# Patient Record
Sex: MALE | URBAN - METROPOLITAN AREA
[De-identification: names, ages, dates, MRNs, and addresses within clinical notes are randomized per-mention and may not be internally consistent; named-entity substitution may affect disease eponyms.]

---

## 2018-01-16 NOTE — RESULT NOTES
Verified Results  Health Maintenance Flow Sheet [id number] 08:33AM Génesis Lax     Test Name Result Flag Reference   Colonoscopy 5 years     [facility]

## 2020-10-13 ENCOUNTER — LAB REQUISITION (OUTPATIENT)
Dept: LAB | Age: 55
End: 2020-10-13

## 2020-10-13 DIAGNOSIS — E11.9 TYPE 2 DIABETES MELLITUS WITHOUT COMPLICATIONS (CMD): ICD-10-CM

## 2020-10-13 LAB
CREAT UR-MCNC: 122 MG/DL
MICROALBUMIN UR-MCNC: 0.9 MG/DL
MICROALBUMIN/CREAT UR: 7.4 MG/G
VIT B12 SERPL-MCNC: 171 PG/ML (ref 211–911)

## 2020-10-13 PROCEDURE — 82043 UR ALBUMIN QUANTITATIVE: CPT | Performed by: CLINICAL MEDICAL LABORATORY

## 2020-10-13 PROCEDURE — PSEU8306 VITAMIN B12: Performed by: CLINICAL MEDICAL LABORATORY

## 2020-10-13 PROCEDURE — 82607 VITAMIN B-12: CPT | Performed by: CLINICAL MEDICAL LABORATORY

## 2020-10-13 PROCEDURE — 82570 ASSAY OF URINE CREATININE: CPT | Performed by: CLINICAL MEDICAL LABORATORY

## 2020-10-13 PROCEDURE — PSEU8567 MICROALBUMIN URINE RANDOM: Performed by: CLINICAL MEDICAL LABORATORY

## 2021-08-26 ENCOUNTER — LAB REQUISITION (OUTPATIENT)
Dept: LAB | Age: 56
End: 2021-08-26

## 2021-08-26 DIAGNOSIS — E11.9 TYPE 2 DIABETES MELLITUS WITHOUT COMPLICATIONS (CMD): ICD-10-CM

## 2021-08-26 LAB — HBA1C MFR BLD: 8 % (ref 4.5–5.6)

## 2021-08-26 PROCEDURE — 83036 HEMOGLOBIN GLYCOSYLATED A1C: CPT | Performed by: CLINICAL MEDICAL LABORATORY

## 2021-08-26 PROCEDURE — 82043 UR ALBUMIN QUANTITATIVE: CPT | Performed by: CLINICAL MEDICAL LABORATORY

## 2021-08-26 PROCEDURE — 82570 ASSAY OF URINE CREATININE: CPT | Performed by: CLINICAL MEDICAL LABORATORY

## 2021-08-26 PROCEDURE — PSEU8306 VITAMIN B12: Performed by: CLINICAL MEDICAL LABORATORY

## 2021-08-26 PROCEDURE — PSEU8567 MICROALBUMIN URINE RANDOM: Performed by: CLINICAL MEDICAL LABORATORY

## 2021-08-26 PROCEDURE — 82607 VITAMIN B-12: CPT | Performed by: CLINICAL MEDICAL LABORATORY

## 2021-08-26 PROCEDURE — PSEU8266 GLYCOHEMOGLOBIN: Performed by: CLINICAL MEDICAL LABORATORY

## 2021-08-27 LAB
CREAT UR-MCNC: 81.8 MG/DL
MICROALBUMIN UR-MCNC: 0.7 MG/DL
MICROALBUMIN/CREAT UR: 8.6 MG/G
VIT B12 SERPL-MCNC: 114 PG/ML (ref 211–911)

## 2021-11-24 ENCOUNTER — LAB REQUISITION (OUTPATIENT)
Dept: LAB | Age: 56
End: 2021-11-24

## 2021-11-24 DIAGNOSIS — E11.9 TYPE 2 DIABETES MELLITUS WITHOUT COMPLICATIONS (CMD): ICD-10-CM

## 2021-11-24 LAB — HBA1C MFR BLD: 6.9 % (ref 4.5–5.6)

## 2021-11-24 PROCEDURE — PSEU8266 GLYCOHEMOGLOBIN: Performed by: CLINICAL MEDICAL LABORATORY

## 2021-11-24 PROCEDURE — 83036 HEMOGLOBIN GLYCOSYLATED A1C: CPT | Performed by: CLINICAL MEDICAL LABORATORY

## 2022-05-03 ENCOUNTER — LAB REQUISITION (OUTPATIENT)
Dept: LAB | Age: 57
End: 2022-05-03

## 2022-05-03 DIAGNOSIS — E11.9 TYPE 2 DIABETES MELLITUS WITHOUT COMPLICATIONS (CMD): ICD-10-CM

## 2022-05-03 LAB — HBA1C MFR BLD: 6.5 % (ref 4.5–5.6)

## 2022-05-03 PROCEDURE — PSEU8266 GLYCOHEMOGLOBIN: Performed by: CLINICAL MEDICAL LABORATORY

## 2022-05-03 PROCEDURE — 83036 HEMOGLOBIN GLYCOSYLATED A1C: CPT | Performed by: CLINICAL MEDICAL LABORATORY

## 2022-07-26 ENCOUNTER — LAB REQUISITION (OUTPATIENT)
Dept: LAB | Age: 57
End: 2022-07-26

## 2022-07-26 DIAGNOSIS — E11.9 TYPE 2 DIABETES MELLITUS WITHOUT COMPLICATIONS (CMD): ICD-10-CM

## 2022-07-26 LAB
HBA1C MFR BLD: 6.4 % (ref 4.5–5.6)
VIT B12 SERPL-MCNC: 153 PG/ML (ref 211–911)

## 2022-07-26 PROCEDURE — 82043 UR ALBUMIN QUANTITATIVE: CPT | Performed by: CLINICAL MEDICAL LABORATORY

## 2022-07-26 PROCEDURE — PSEU8306 VITAMIN B12: Performed by: CLINICAL MEDICAL LABORATORY

## 2022-07-26 PROCEDURE — PSEU8266 GLYCOHEMOGLOBIN: Performed by: CLINICAL MEDICAL LABORATORY

## 2022-07-26 PROCEDURE — 83036 HEMOGLOBIN GLYCOSYLATED A1C: CPT | Performed by: CLINICAL MEDICAL LABORATORY

## 2022-07-26 PROCEDURE — 82570 ASSAY OF URINE CREATININE: CPT | Performed by: CLINICAL MEDICAL LABORATORY

## 2022-07-26 PROCEDURE — PSEU8567 MICROALBUMIN URINE RANDOM: Performed by: CLINICAL MEDICAL LABORATORY

## 2022-07-26 PROCEDURE — 82607 VITAMIN B-12: CPT | Performed by: CLINICAL MEDICAL LABORATORY

## 2022-07-27 LAB
CREAT UR-MCNC: 159 MG/DL
MICROALBUMIN UR-MCNC: 0.98 MG/DL
MICROALBUMIN/CREAT UR: 6.2 MG/G

## 2022-11-07 ENCOUNTER — LAB REQUISITION (OUTPATIENT)
Dept: LAB | Age: 57
End: 2022-11-07

## 2022-11-07 DIAGNOSIS — R97.20 ELEVATED PROSTATE SPECIFIC ANTIGEN (PSA): ICD-10-CM

## 2022-11-07 PROCEDURE — PSEU8979 FLUOROQUINOLONE RESISTANT GRAM NEGATIVE BACILLI SCREEN, BACTERIAL CULTURE: Performed by: CLINICAL MEDICAL LABORATORY

## 2022-11-07 PROCEDURE — 87081 CULTURE SCREEN ONLY: CPT | Performed by: CLINICAL MEDICAL LABORATORY

## 2022-11-10 LAB — BACTERIA.FLUOROQ RESIST ANAL CULT: NORMAL

## 2022-11-14 PROCEDURE — PSEU10635 2019 NOVEL CORONAVIRUS (SARS-COV-2): Performed by: CLINICAL MEDICAL LABORATORY

## 2022-11-14 PROCEDURE — U0005 INFEC AGEN DETEC AMPLI PROBE: HCPCS | Performed by: CLINICAL MEDICAL LABORATORY

## 2022-11-14 PROCEDURE — U0003 INFECTIOUS AGENT DETECTION BY NUCLEIC ACID (DNA OR RNA); SEVERE ACUTE RESPIRATORY SYNDROME CORONAVIRUS 2 (SARS-COV-2) (CORONAVIRUS DISEASE [COVID-19]), AMPLIFIED PROBE TECHNIQUE, MAKING USE OF HIGH THROUGHPUT TECHNOLOGIES AS DESCRIBED BY CMS-2020-01-R: HCPCS | Performed by: CLINICAL MEDICAL LABORATORY

## 2022-11-15 ENCOUNTER — LAB REQUISITION (OUTPATIENT)
Dept: LAB | Age: 57
End: 2022-11-15

## 2022-11-15 DIAGNOSIS — Z11.59 ENCOUNTER FOR SCREENING FOR OTHER VIRAL DISEASES: ICD-10-CM

## 2022-11-15 LAB
SARS-COV-2 RNA RESP QL NAA+PROBE: NOT DETECTED
SERVICE CMNT-IMP: NORMAL
SERVICE CMNT-IMP: NORMAL

## 2023-03-28 ENCOUNTER — LAB REQUISITION (OUTPATIENT)
Dept: LAB | Age: 58
End: 2023-03-28

## 2023-03-28 DIAGNOSIS — E11.9 TYPE 2 DIABETES MELLITUS WITHOUT COMPLICATIONS (CMD): ICD-10-CM

## 2023-03-28 LAB
HBA1C MFR BLD: 6.6 % (ref 4.5–5.6)
VIT B12 SERPL-MCNC: 283 PG/ML (ref 211–911)

## 2023-03-28 PROCEDURE — 83036 HEMOGLOBIN GLYCOSYLATED A1C: CPT | Performed by: CLINICAL MEDICAL LABORATORY

## 2023-03-28 PROCEDURE — PSEU8306 VITAMIN B12: Performed by: CLINICAL MEDICAL LABORATORY

## 2023-03-28 PROCEDURE — PSEU8266 GLYCOHEMOGLOBIN: Performed by: CLINICAL MEDICAL LABORATORY

## 2023-03-28 PROCEDURE — 82607 VITAMIN B-12: CPT | Performed by: CLINICAL MEDICAL LABORATORY

## 2023-07-20 ENCOUNTER — LAB REQUISITION (OUTPATIENT)
Dept: LAB | Age: 58
End: 2023-07-20

## 2023-07-20 DIAGNOSIS — E11.9 TYPE 2 DIABETES MELLITUS WITHOUT COMPLICATIONS  (CMD): ICD-10-CM

## 2023-07-20 LAB — HBA1C MFR BLD: 6.4 % (ref 4.5–5.6)

## 2023-07-20 PROCEDURE — 82570 ASSAY OF URINE CREATININE: CPT | Performed by: CLINICAL MEDICAL LABORATORY

## 2023-07-20 PROCEDURE — PSEU8266 GLYCOHEMOGLOBIN: Performed by: CLINICAL MEDICAL LABORATORY

## 2023-07-20 PROCEDURE — 83036 HEMOGLOBIN GLYCOSYLATED A1C: CPT | Performed by: CLINICAL MEDICAL LABORATORY

## 2023-07-20 PROCEDURE — PSEU8567 MICROALBUMIN URINE RANDOM: Performed by: CLINICAL MEDICAL LABORATORY

## 2023-07-20 PROCEDURE — 82043 UR ALBUMIN QUANTITATIVE: CPT | Performed by: CLINICAL MEDICAL LABORATORY

## 2023-07-21 LAB
CREAT UR-MCNC: 74.7 MG/DL
MICROALBUMIN UR-MCNC: <0.5 MG/DL
MICROALBUMIN/CREAT UR: NORMAL MG/G{CREAT}

## 2024-01-18 ENCOUNTER — LAB REQUISITION (OUTPATIENT)
Dept: LAB | Age: 59
End: 2024-01-18

## 2024-01-18 DIAGNOSIS — E11.9 TYPE 2 DIABETES MELLITUS WITHOUT COMPLICATIONS  (CMD): ICD-10-CM

## 2024-01-18 LAB — HBA1C MFR BLD: 5.8 % (ref 4.5–5.6)

## 2024-01-18 PROCEDURE — 83036 HEMOGLOBIN GLYCOSYLATED A1C: CPT | Performed by: CLINICAL MEDICAL LABORATORY

## 2024-01-18 PROCEDURE — PSEU8567 MICROALBUMIN URINE RANDOM: Performed by: CLINICAL MEDICAL LABORATORY

## 2024-01-18 PROCEDURE — 82043 UR ALBUMIN QUANTITATIVE: CPT | Performed by: CLINICAL MEDICAL LABORATORY

## 2024-01-18 PROCEDURE — PSEU8266 GLYCOHEMOGLOBIN: Performed by: CLINICAL MEDICAL LABORATORY

## 2024-01-18 PROCEDURE — 82570 ASSAY OF URINE CREATININE: CPT | Performed by: CLINICAL MEDICAL LABORATORY

## 2024-01-20 LAB
CREAT UR-MCNC: 155 MG/DL
MICROALBUMIN UR-MCNC: 0.94 MG/DL
MICROALBUMIN/CREAT UR: 6.1 MG/G

## 2024-07-08 ENCOUNTER — LAB REQUISITION (OUTPATIENT)
Dept: LAB | Age: 59
End: 2024-07-08

## 2024-07-08 DIAGNOSIS — E11.9 TYPE 2 DIABETES MELLITUS WITHOUT COMPLICATIONS  (CMD): ICD-10-CM

## 2024-07-08 LAB
HBA1C MFR BLD: 6.2 % (ref 4.5–5.6)
VIT B12 SERPL-MCNC: 131 PG/ML (ref 211–911)

## 2024-07-08 PROCEDURE — 83036 HEMOGLOBIN GLYCOSYLATED A1C: CPT | Performed by: CLINICAL MEDICAL LABORATORY

## 2024-07-08 PROCEDURE — PSEU8266 GLYCOHEMOGLOBIN: Performed by: CLINICAL MEDICAL LABORATORY

## 2024-07-08 PROCEDURE — PSEU8567 MICROALBUMIN URINE RANDOM: Performed by: CLINICAL MEDICAL LABORATORY

## 2024-07-08 PROCEDURE — 82607 VITAMIN B-12: CPT | Performed by: CLINICAL MEDICAL LABORATORY

## 2024-07-08 PROCEDURE — 82570 ASSAY OF URINE CREATININE: CPT | Performed by: CLINICAL MEDICAL LABORATORY

## 2024-07-08 PROCEDURE — 82043 UR ALBUMIN QUANTITATIVE: CPT | Performed by: CLINICAL MEDICAL LABORATORY

## 2024-07-08 PROCEDURE — PSEU8306 VITAMIN B12: Performed by: CLINICAL MEDICAL LABORATORY

## 2024-07-09 LAB
CREAT UR-MCNC: 195 MG/DL
MICROALBUMIN UR-MCNC: 1.57 MG/DL
MICROALBUMIN/CREAT UR: 8.1 MG/G

## 2025-01-21 ENCOUNTER — LAB REQUISITION (OUTPATIENT)
Dept: LAB | Age: 60
End: 2025-01-21

## 2025-01-21 DIAGNOSIS — E11.9 TYPE 2 DIABETES MELLITUS WITHOUT COMPLICATIONS  (CMD): ICD-10-CM

## 2025-01-21 LAB — HBA1C MFR BLD: 6.7 % (ref 4.5–5.6)

## 2025-01-21 PROCEDURE — 83036 HEMOGLOBIN GLYCOSYLATED A1C: CPT | Performed by: CLINICAL MEDICAL LABORATORY

## 2025-01-21 PROCEDURE — PSEU8266 GLYCOHEMOGLOBIN: Performed by: CLINICAL MEDICAL LABORATORY

## 2025-07-02 ENCOUNTER — LAB REQUISITION (OUTPATIENT)
Dept: LAB | Age: 60
End: 2025-07-02

## 2025-07-02 DIAGNOSIS — E11.9 TYPE 2 DIABETES MELLITUS WITHOUT COMPLICATIONS (CMD): ICD-10-CM

## 2025-07-02 LAB — HBA1C MFR BLD: 7.2 % (ref 4.5–5.6)

## 2025-07-02 PROCEDURE — PSEU8567 ALBUMIN, RANDOM URINE WITH CREATININE: Performed by: CLINICAL MEDICAL LABORATORY

## 2025-07-02 PROCEDURE — PSEU8266 GLYCOHEMOGLOBIN: Performed by: CLINICAL MEDICAL LABORATORY

## 2025-07-02 PROCEDURE — 82043 UR ALBUMIN QUANTITATIVE: CPT | Performed by: CLINICAL MEDICAL LABORATORY

## 2025-07-02 PROCEDURE — 82607 VITAMIN B-12: CPT | Performed by: CLINICAL MEDICAL LABORATORY

## 2025-07-02 PROCEDURE — 82570 ASSAY OF URINE CREATININE: CPT | Performed by: CLINICAL MEDICAL LABORATORY

## 2025-07-02 PROCEDURE — PSEU8306 VITAMIN B12: Performed by: CLINICAL MEDICAL LABORATORY

## 2025-07-02 PROCEDURE — 83036 HEMOGLOBIN GLYCOSYLATED A1C: CPT | Performed by: CLINICAL MEDICAL LABORATORY

## 2025-07-03 LAB — VIT B12 SERPL-MCNC: 346 PG/ML (ref 211–911)

## 2025-07-04 LAB
CREAT UR-MCNC: 124 MG/DL
MICROALBUMIN UR-MCNC: 1.77 MG/DL
MICROALBUMIN/CREAT UR: 14.3 MG/G

## 2025-08-12 ENCOUNTER — LAB REQUISITION (OUTPATIENT)
Dept: LAB | Age: 60
End: 2025-08-12

## 2025-08-12 DIAGNOSIS — C61 MALIGNANT NEOPLASM OF PROSTATE  (CMD): ICD-10-CM

## 2025-08-12 PROCEDURE — 84403 ASSAY OF TOTAL TESTOSTERONE: CPT | Performed by: CLINICAL MEDICAL LABORATORY

## 2025-08-12 PROCEDURE — PSEU8346 TESTOSTERONE, TOTAL, MALE: Performed by: CLINICAL MEDICAL LABORATORY

## 2025-08-13 LAB — TESTOST SERPL-MCNC: 212.8 NG/DL (ref 280–1100)
